# Patient Record
Sex: MALE | Race: WHITE | URBAN - METROPOLITAN AREA
[De-identification: names, ages, dates, MRNs, and addresses within clinical notes are randomized per-mention and may not be internally consistent; named-entity substitution may affect disease eponyms.]

---

## 2019-05-07 ENCOUNTER — HOSPITAL ENCOUNTER (EMERGENCY)
Facility: CLINIC | Age: 49
Discharge: HOME | End: 2019-05-07
Attending: EMERGENCY MEDICINE | Admitting: EMERGENCY MEDICINE
Payer: COMMERCIAL

## 2019-05-07 VITALS
BODY MASS INDEX: 27.02 KG/M2 | SYSTOLIC BLOOD PRESSURE: 120 MMHG | DIASTOLIC BLOOD PRESSURE: 83 MMHG | TEMPERATURE: 97.9 F | HEART RATE: 59 BPM | HEIGHT: 71 IN | WEIGHT: 193 LBS | OXYGEN SATURATION: 96 %

## 2019-05-07 DIAGNOSIS — S01.511A LIP LACERATION, INITIAL ENCOUNTER: ICD-10-CM

## 2019-05-07 PROCEDURE — 12011 RPR F/E/E/N/L/M 2.5 CM/<: CPT

## 2019-05-07 PROCEDURE — 99283 EMERGENCY DEPT VISIT LOW MDM: CPT | Mod: 25

## 2019-05-07 ASSESSMENT — ENCOUNTER SYMPTOMS
FACIAL SWELLING: 1
WOUND: 1
MYALGIAS: 0

## 2019-05-07 ASSESSMENT — MIFFLIN-ST. JEOR: SCORE: 1762.57

## 2019-05-07 NOTE — DISCHARGE INSTRUCTIONS
Ice to your upper lip, Tylenol or ibuprofen as needed.  See your doctor next Monday for suture removal and reevaluation.  After the scab comes off, recommend sunscreen for a year, Mederma also helps to prevent scarring.

## 2019-05-07 NOTE — ED PROVIDER NOTES
"  History     Chief Complaint:  Laceration    HPI   Stas Robert is an otherwise healthy 48 year old male who presents with a lip laceration. The patient reports that just prior to arrival, while playing in a men's hockey league, he was hit in the face with a hockey puck. He sustained a laceration to his left upper lip secondary to the incident, and thus presented to the ED for evaluation. Here in the ED, the area is additionally swollen. He denies any pain or dental concerns. He did not lose consciousness as a result of the incident. Of note, his tetanus is UTD. He has no other symptoms or complaints here today.    Allergies:  The patient has no known drug allergies.    Medications:    The patient is currently on no regular medications.     Past Medical History:    The patient denies any significant past medical history.    Past Surgical History:    The patient does not have any pertinent past surgical history.    Family History:    No past pertinent family history.    Social History:  Negative for tobacco use.  5 standard drinks/week  Marital Status:   [2]    Review of Systems   HENT: Positive for facial swelling. Negative for dental problem.    Musculoskeletal: Negative for myalgias.   Skin: Positive for wound.   All other systems reviewed and are negative.      Physical Exam     Patient Vitals for the past 24 hrs:   BP Temp Temp src Pulse SpO2 Height Weight   05/07/19 1120 131/73 97.9  F (36.6  C) Oral 91 97 % 1.803 m (5' 11\") 87.5 kg (193 lb)     Physical Exam  Nursing note and vitals reviewed.  Constitutional:  Alert.  Appears well-developed and well-nourished, comfortable.   HENT:    No tenderness over the zygoma.   Nose:    Nose normal.  Mouth/Throat:  Oropharynx normal. Teeth are intact.   Eyes:    Conjunctivae are normal.      Right eye exhibits no discharge. Left eye exhibits no discharge.   Lymph:   No submandibular or anterior cervical lymphadenopathy.   Neurological:   Alert and appropriate. " No focal weakness.  Skin:    No rash noted. No diaphoresis.      No erythema. No pallor. Vertical left sided upper lip laceration, just up against the vermilion border, with swelling. Inside of lip normal.   Psychiatric:   Behavior is normal. Judgment and thought content normal.     Emergency Department Course     Procedures:   Laceration Repair        LACERATION:  A simple and superficial clean 1.8 cm laceration.      LOCATION:  Left Upper Lip      FUNCTION:  Distally sensation, circulation and motor are intact.      ANESTHESIA:  Local using 0.5% Marcaine with epinephrine total of 2 mLs      PREPARATION:  Irrigation and Scrubbing with Normal Saline and Shur Clens      DEBRIDEMENT:  no debridement      CLOSURE:  Wound was closed with One Layer.  Skin closed with 6 x 6.0 Ethylon using interrupted sutures.    Emergency Department Course:  Nursing notes and vitals reviewed. (5783) I performed an exam of the patient as documented above.     (1233) I reevaluated the patient and provided an update in regards to his ED course.  I performed a laceration repair, as noted above. There were no complications of the procedure.    Findings and plan explained to the Patient. Patient discharged home with instructions regarding supportive care, medications, and reasons to return. The importance of close follow-up was reviewed.     I personally answered all related questions prior to discharge.     Impression & Plan      Medical Decision Making:  Stas Robert is a 48 year old male  who presents for evaluation of a lip laceration.  The wound was carefully evaluated and explored.  The laceration does not involve the vermilion border.      The laceration was closed with sutures as noted above.  There is no evidence of muscular, tendon, or bony damage with this laceration.  No signs of foreign body.  Possible complications (infection, scarring) were reviewed with the patient.  At the time of repair, there was good cosmesis and  hemostasis and normal facial expressions.  Follow up with primary care will be indicated for suture removal as noted in the discharge section.    Diagnosis:    ICD-10-CM    1. Lip laceration, initial encounter S01.511A      Disposition:  Discharged to home. Ice to your upper lip, Tylenol or ibuprofen as needed.  See your doctor next Monday for suture removal and reevaluation.  After the scab comes off, recommend sunscreen for a year, Mederma also helps to prevent scarring.    Scribe Disclosure:  IZaynab, am serving as a scribe on 5/7/2019 at 11:33 AM to personally document services performed by Melida Delgado MD based on my observations and the provider's statements to me.     Zaynab Johnson  5/7/2019    EMERGENCY DEPARTMENT       Melida Delgado MD  05/07/19 [id number]